# Patient Record
Sex: MALE | NOT HISPANIC OR LATINO | Employment: OTHER | URBAN - METROPOLITAN AREA
[De-identification: names, ages, dates, MRNs, and addresses within clinical notes are randomized per-mention and may not be internally consistent; named-entity substitution may affect disease eponyms.]

---

## 2021-12-08 ENCOUNTER — TELEPHONE (OUTPATIENT)
Dept: OBGYN CLINIC | Facility: HOSPITAL | Age: 31
End: 2021-12-08

## 2022-05-22 ENCOUNTER — TELEPHONE (OUTPATIENT)
Dept: OTHER | Facility: OTHER | Age: 32
End: 2022-05-22

## 2022-05-22 NOTE — TELEPHONE ENCOUNTER
Pt called, requesting a call back from office at best convenience to schedule a NP appointment   Pt did not realize that appointment made with [facility] is in [place]

## 2022-05-23 NOTE — TELEPHONE ENCOUNTER
After Visit Summary   7/25/2017    Dav Seay    MRN: [id number]           Patient Information     Date Of Birth          1952        Visit Information        Provider Department      7/25/2017 3:00 PM Norris Townsend MD [facility]        Today's Diagnoses     Carpal tunnel syndrome of left wrist    -  1    Carpal tunnel syndrome of right wrist           Follow-ups after your visit        Your next 10 appointments already scheduled     Oct 18, 2017   Procedure with Norris Townsend MD   [facility] ([facility])    [address] [id number] [phone]           From [address]: Exit at [address] on south side of [place]. Turn right on [address]. Turn left at stoplight on [address]. [facility] will be in view two blocks ahead            Oct 30, 2017  1:00 PM CDT   Return Visit with Norris Townsend MD   [facility] ([facility])    [address] [id number] [phone]              Who to contact     If you have questions or need follow up information about today's clinic visit or your schedule please contact [facility] directly at [phone].  Normal or non-critical lab and imaging results will be communicated to you by MyChart, letter or phone within 4 business days after the clinic has received the results. If you do not hear from us within 7 days, please contact the clinic through [facility] or phone. If you have a critical or abnormal lab result, we will notify you by phone as soon as possible.  Submit refill requests through [employer] or call your pharmacy and they will forward the refill request to us. Please allow 3 business days for your refill to be completed.          Additional Information About Your Visit        [facility] Information     [employer] gives you secure access to your electronic health record. If you  L/M informing pt the next appt in [place] is 8/8/22; there is AM and PM appts available that day if he is interested  see a primary care provider, you can also send messages to your care team and make appointments. If you have questions, please call your primary care clinic.  If you do not have a primary care provider, please call [phone] and they will assist you.        Care EveryWhere ID     This is your Care EveryWhere ID. This could be used by other organizations to access your [place] medical records  [id number]        Your Vitals Were     Temperature BMI (Body Mass Index)                97.8  F (36.6  C) (Temporal) 28.75 kg/m2           Blood Pressure from Last 3 Encounters:   06/23/17 130/74   06/11/17 135/79   06/05/17 130/80    Weight from Last 3 Encounters:   07/25/17 96.2 kg (212 lb)   06/23/17 97.4 kg (214 lb 11.2 oz)   06/11/17 97.8 kg (215 lb 8 oz)              Today, you had the following     No orders found for display       Primary Care Provider Office Phone # Fax #    Ryan Perera [phone]       [facility] [address] [facility] [address]        Equal Access to Services     JACKIE ALBERT : Hadii rehan espinoza Soanna, waaxda lubenyadaha, qaybta kaalmada shivani, neto kwok. So [facility] [place] [phone].    ATENCIÓN: Si habla español, tiene a patten disposición servicios gratuitos de asistencia lingüística. Llame al [phone].    We comply with applicable federal civil rights laws and [place] laws. We do not discriminate on the basis of race, color, national origin, age, disability sex, sexual orientation or gender identity.            Thank you!     Thank you for choosing [facility]  for your care. Our goal is always to provide you with excellent care. Hearing back from our patients is one way we can continue to improve our services. Please take a few minutes to complete the written survey that you may receive in the mail after your visit with us. Thank you!             Your Updated Medication List - Protect others around you: Learn  how to safely use, store and throw away your medicines at www.disposemymeds.org.          This list is accurate as of: 7/25/17  4:44 PM.  Always use your most recent med list.                   Brand Name Dispense Instructions for use Diagnosis    atenolol 25 MG tablet    TENORMIN    90 tablet    TAKE 1 TABLET BY MOUTH EVER Y DAY    Hypertension goal BP (blood pressure) < 140/90       celecoxib 200 MG capsule    celeBREX    60 capsule    Take 1 capsule (200 mg) by mouth daily    Arthralgia, unspecified joint       MELATONIN PO

## 2022-06-01 ENCOUNTER — CONSULT (OUTPATIENT)
Dept: PAIN MEDICINE | Facility: CLINIC | Age: 32
End: 2022-06-01
Payer: COMMERCIAL

## 2022-06-01 VITALS — SYSTOLIC BLOOD PRESSURE: 142 MMHG | HEART RATE: 80 BPM | WEIGHT: 163.8 LBS | DIASTOLIC BLOOD PRESSURE: 91 MMHG

## 2022-06-01 DIAGNOSIS — G89.4 CHRONIC PAIN SYNDROME: Primary | ICD-10-CM

## 2022-06-01 DIAGNOSIS — M79.18 MYOFASCIAL PAIN SYNDROME: ICD-10-CM

## 2022-06-01 DIAGNOSIS — M25.572 CHRONIC PAIN OF BOTH ANKLES: ICD-10-CM

## 2022-06-01 DIAGNOSIS — M79.642 BILATERAL HAND PAIN: ICD-10-CM

## 2022-06-01 DIAGNOSIS — M25.571 CHRONIC PAIN OF BOTH ANKLES: ICD-10-CM

## 2022-06-01 DIAGNOSIS — M79.641 BILATERAL HAND PAIN: ICD-10-CM

## 2022-06-01 DIAGNOSIS — G89.29 CHRONIC PAIN OF BOTH ANKLES: ICD-10-CM

## 2022-06-01 DIAGNOSIS — M25.50 MULTIPLE JOINT PAIN: ICD-10-CM

## 2022-06-01 PROCEDURE — 99204 OFFICE O/P NEW MOD 45 MIN: CPT | Performed by: ANESTHESIOLOGY

## 2022-06-01 RX ORDER — TIZANIDINE 4 MG/1
4 TABLET ORAL 3 TIMES DAILY PRN
Qty: 90 TABLET | Refills: 0 | Status: SHIPPED | OUTPATIENT
Start: 2022-06-01

## 2022-06-01 RX ORDER — IBUPROFEN 800 MG/1
TABLET ORAL
COMMUNITY

## 2022-06-01 RX ORDER — BUPRENORPHINE AND NALOXONE 4; 1 MG/1; MG/1
FILM, SOLUBLE BUCCAL; SUBLINGUAL EVERY 24 HOURS
COMMUNITY

## 2022-06-01 NOTE — PROGRESS NOTES
Assessment:  1  Chronic pain syndrome    2  Bilateral hand pain    3  Multiple joint pain    4  Chronic pain of both ankles    5  Myofascial pain syndrome        Plan:  Orders Placed This Encounter   Procedures    Ambulatory referral to Orthopedic Surgery     Standing Status:   Future     Standing Expiration Date:   6/1/2023     Referral Priority:   Routine     Referral Type:   Consult - AMB     Referral Reason:   Specialty Services Required     Referred to Provider:   German Sicard, MD     Requested Specialty:   Orthopedic Surgery     Number of Visits Requested:   1     Expiration Date:   6/1/2023    Ambulatory Referral to Rheumatology     Standing Status:   Future     Standing Expiration Date:   6/1/2023     Referral Priority:   Routine     Referral Type:   Consult - AMB     Referral Reason:   Specialty Services Required     Referred to Provider:   Diana Stevenson MD     Requested Specialty:   Rheumatology     Number of Visits Requested:   1     Expiration Date:   6/1/2023       New Medications Ordered This Visit   Medications    buprenorphine-naloxone (Suboxone) 4-1 MG     Sig: every 24 hours    ibuprofen (MOTRIN) 800 mg tablet     Sig: Every 6 hours    tiZANidine (ZANAFLEX) 4 mg tablet     Sig: Take 1 tablet (4 mg total) by mouth 3 (three) times a day as needed for muscle spasms     Dispense:  90 tablet     Refill:  0     My impressions and treatment recommendations were discussed in detail with the patient, who verbalized understanding and had no further questions  The patient is complaining of pain patient is complaining of pain in his neck, bilateral hands, and bilateral ankles  He also has significant pain involving his bilateral fingers  I did feel reasonable to have the patient undergo a rheumatological evaluation regarding his multiple joint pain and bilateral hand and finger pain  I have referred him to Rheumatology regarding this    I also feel reasonable to have the patient speak to our foot and ankle specialist in provided a referral for his bilateral ankle pain  His neck pain appears largely myofascial on my examination today  As such, I felt a reasonable to trial the patient on a muscle relaxant in the form of tizanidine 4 mg 1 tablet 3 times daily as needed for muscle spasms  Side effects were reviewed with the patient  He is also currently maintained on buprenorphine/naloxone at this time from an addiction specialist     Follow-up is planned in 4 weeks time or sooner as warranted  Discharge instructions were provided  I personally saw and examined the patient and I agree with the above discussed plan of care  History of Present Illness:    Eduardo Fitzgerald is a 32 y o  male who presents to [facility] for initial evaluation of the above stated pain complaints  The patient has a past medical and chronic pain history as outlined in the assessment section  He was self-referred  The patient is reporting pain primarily in his neck, bilateral hands, and bilateral ankles  He describes his pain as severe and 6 to 7/10 on the verbal numerical pain rating scale  His pain is constant in nature  His pain is worse in the morning and night  He describes his pain as burning, cramping, shooting, numbness, sharp, pins and needles, pressure like, throbbing, dull/aching  He reports weakness in his upper extremities  He does not ambulate with any assistive devices  Lying down and sitting decreases pain  Relaxation increases pain  Patient currently uses CBD edibles  He is also maintained on Suboxone  Nerve blocks, physical therapy, and home exercises provided moderate pain relief  Heat/ice treatment provided no pain relief  Aspirin and ibuprofen are currently being used  Review of Systems:    Review of Systems   Constitutional: Negative for fever and unexpected weight change  HENT: Negative for trouble swallowing           Eye pain  Red eye  Visual disturbances   Eyes: Negative for visual disturbance  Respiratory: Negative for shortness of breath and wheezing  Cardiovascular: Positive for chest pain and leg swelling  Negative for palpitations  Gastrointestinal: Positive for abdominal pain  Negative for constipation, diarrhea, nausea and vomiting  Endocrine: Negative for cold intolerance, heat intolerance and polydipsia  Genitourinary: Negative for difficulty urinating and frequency  Musculoskeletal: Positive for arthralgias, joint swelling and myalgias  Negative for gait problem  Skin: Negative for rash  Neurological: Positive for numbness and headaches  Negative for dizziness, seizures, syncope and weakness  Difficulty concentrating   Hematological: Does not bruise/bleed easily  Psychiatric/Behavioral: Negative for dysphoric mood  All other systems reviewed and are negative  There is no problem list on file for this patient        Past Medical History:   Diagnosis Date    Anxiety     Depression        Past Surgical History:   Procedure Laterality Date    ANKLE FRACTURE SURGERY Right     13 years       Family History   Problem Relation Age of Onset    Diabetes Mother     Cancer Father        Social History     Occupational History    Not on file   Tobacco Use    Smoking status: Current Every Day Smoker     Packs/day: 0 50     Types: Cigars    Smokeless tobacco: Never Used   Vaping Use    Vaping Use: Some days   Substance and Sexual Activity    Alcohol use: Never    Drug use: Not on file     Comment: CBD EDIBLES    Sexual activity: Not on file         Current Outpatient Medications:     buprenorphine-naloxone (Suboxone) 4-1 MG, every 24 hours, Disp: , Rfl:     ibuprofen (MOTRIN) 800 mg tablet, Every 6 hours, Disp: , Rfl:     tiZANidine (ZANAFLEX) 4 mg tablet, Take 1 tablet (4 mg total) by mouth 3 (three) times a day as needed for muscle spasms, Disp: 90 tablet, Rfl: 0    No Known Allergies    Physical Exam:    BP 142/91 (BP Location: Right arm, Patient Position: Sitting, Cuff Size: Standard)   Pulse 80   Wt 74 3 kg (163 lb 12 8 oz)     Constitutional: normal, well developed, well nourished, alert, in no distress and non-toxic and no overt pain behavior  Eyes: anicteric  HEENT: grossly intact  Neck: supple, symmetric, trachea midline and no masses   Pulmonary:even and unlabored  Cardiovascular:No edema or pitting edema present  Skin:Normal without rashes or lesions and well hydrated  Psychiatric:Mood and affect appropriate  Neurologic:Cranial Nerves II-XII grossly intact  Musculoskeletal:normal     Cervical Spine Exam    Appearance:  Normal lordosis  Palpation/Tenderness:  no tenderness or spasm  Sensory:  no sensory deficits noted  Range of Motion:  Flexion:   Moderately limited  with pain  Extension:  Moderately limited  with pain  Lateral Flexion - Left:  Moderately limited  with pain  Lateral Flexion - Right:  Moderately limited  with pain  Rotation - Left:  Moderately limited  with pain  Rotation - Right:  Moderately limited  with pain  Motor Strength:  Left Arm Flexion  5/5  Left Arm Extension  5/5  Right Arm Flexion  5/5  Right Arm Extension  5/5  Left Wrist Flexion  5/5  Left Wrist Extension  5/5  Left Finger Abduction  5/5  Right Finger Abduction  5/5  Left Pincer Grasp  5/5  Right Pincer Grasp  5/5  Left    5/5  Right   5/5  Reflexes:  Left Biceps:  2+   Right Biceps:  2+   Left Brachioradialis:  2+   Right Brachioradialis:  2+   Left Triceps:  2+   Right Triceps:  2+   Special Tests:  Left Spurlings:  negative  Right Spurlings  negative        Imaging  No orders to display       Orders Placed This Encounter   Procedures    Ambulatory referral to Orthopedic Surgery    Ambulatory Referral to Rheumatology